# Patient Record
Sex: FEMALE | Race: WHITE
[De-identification: names, ages, dates, MRNs, and addresses within clinical notes are randomized per-mention and may not be internally consistent; named-entity substitution may affect disease eponyms.]

---

## 2017-05-15 ENCOUNTER — HOSPITAL ENCOUNTER (EMERGENCY)
Dept: HOSPITAL 68 - ERH | Age: 72
End: 2017-05-15
Payer: COMMERCIAL

## 2017-05-15 VITALS — BODY MASS INDEX: 23.07 KG/M2 | WEIGHT: 147 LBS | HEIGHT: 67 IN

## 2017-05-15 VITALS — DIASTOLIC BLOOD PRESSURE: 79 MMHG | SYSTOLIC BLOOD PRESSURE: 163 MMHG

## 2017-05-15 DIAGNOSIS — R53.1: Primary | ICD-10-CM

## 2017-05-15 LAB
ABSOLUTE GRANULOCYTE CT: 3.4 /CUMM (ref 1.4–6.5)
BASOPHILS # BLD: 0 /CUMM (ref 0–0.2)
BASOPHILS NFR BLD: 0.4 % (ref 0–2)
EOSINOPHIL # BLD: 0 /CUMM (ref 0–0.7)
EOSINOPHIL NFR BLD: 0.3 % (ref 0–5)
ERYTHROCYTE [DISTWIDTH] IN BLOOD BY AUTOMATED COUNT: 13.3 % (ref 11.5–14.5)
GRANULOCYTES NFR BLD: 71.4 % (ref 42.2–75.2)
HCT VFR BLD CALC: 34.7 % (ref 37–47)
LYMPHOCYTES # BLD: 1 /CUMM (ref 1.2–3.4)
MCH RBC QN AUTO: 31.8 PG (ref 27–31)
MCHC RBC AUTO-ENTMCNC: 34.3 G/DL (ref 33–37)
MCV RBC AUTO: 92.8 FL (ref 81–99)
MONOCYTES # BLD: 0.4 /CUMM (ref 0.1–0.6)
PLATELET # BLD: 256 /CUMM (ref 130–400)
PMV BLD AUTO: 8.2 FL (ref 7.4–10.4)
RED BLOOD CELL CT: 3.74 /CUMM (ref 4.2–5.4)
WBC # BLD AUTO: 4.8 /CUMM (ref 4.8–10.8)

## 2017-05-15 NOTE — ED AMS/SEIZURE/WEAK/DIZZY
History of Present Illness
 
General
Chief Complaint: General Adult
Stated Complaint: ELEAZAR YANG FOR INCREASED WEAKNESS
Source: patient, family, old records
Exam Limitations: no limitations
 
Vital Signs & Intake/Output
Vital Signs & Intake/Output
 Vital Signs
 
 
Date Time Temp Pulse Resp B/P B/P Pulse O2 O2 Flow FiO2
 
     Mean Ox Delivery Rate 
 
05/15 1212 99.1 76 16 146/88  98 Room Air  
 
 
Allergies
Coded Allergies:
No Known Allergies (05/15/17)
 
Triage Note:
72 Y/O FEMALE C/O INTERMITTENT WEAKNESS SINCE END
 OF MARCH; STATES SHE WAS USUALLY ABLE TO "BOUNCE
 BACK"; HOWEVER RECENTLY HAS BEEN NOTICING MORE
 WEAKNESS THAN USUAL.  PT STATES SHE HAD BLOODWORK
 DONE LAST WEEK AND ALL THAT CAME BACK WAS "LOW
 SODIUM".  DENIES PAIN.  REPORTS NORMAL APPETITE/PO
 INTAKE.  DENIES N/V/D.
Triage Nurses Notes Reviewed? yes
Onset: Abrupt
Duration: week(s):, constant, continues in ED
Timing: recent history
Injury Environment: home
No Modifying Factors: none
HPI:
71-year-old female comes into the emergency room with complaints of increased 
fatigue and weakness is been going on for many weeks.  She's been evaluated by 
her primary doctor a few times.  She has had thyroid function Lyme titer as well
as generalized blood work with no findings.  .  She denies any chest pain 
shortness of breath abdominal pain fever chills vomiting or respiratory 
symptoms.  Patient is normally very healthy and this is not normal for her.  
Denies any other associated symptoms.
(MEGAN SALDIVAR)
 
Past History
 
Travel History
Traveled to [place] past 21 day No
 
Medical History
Any Pertinent Medical History? see below for history
Neurological: NONE
EENT: NONE
Cardiovascular: NONE
Respiratory: NONE
Gastrointestinal: NONE
Hepatic: NONE
Renal: NONE
Musculoskeletal: NONE
Psychiatric: NONE
Endocrine: NONE
Blood Disorders: NONE
Cancer(s): NONE
GYN/Reproductive: NONE
 
Surgical History
Surgical History: non-contributory
 
Psychosocial History
What is your primary language English
Tobacco Use: Quit >30 days ago
 
Family History
Hx Contributory? No
(MEGAN SALDIVAR)
 
Review of Systems
 
Review of Systems
Constitutional:
Reports: see HPI. 
EENTM:
Reports: no symptoms. 
Respiratory:
Reports: no symptoms. 
Cardiovascular:
Reports: no symptoms. 
GI:
Reports: no symptoms. 
Genitourinary:
Reports: no symptoms. 
Musculoskeletal:
Reports: no symptoms. 
Skin:
Reports: no symptoms. 
Neurological/Psychological:
Reports: no symptoms. 
Hematologic/Endocrine:
Reports: no symptoms. 
Immunologic/Allergic:
Reports: no symptoms. 
All Other Systems: Reviewed and Negative
(MEGAN SALDIVAR)
 
Physical Exam
 
Physical Exam
General Appearance: well developed/nourished, no apparent distress, alert, awake
Head: atraumatic, normal appearance
Eyes:
Bilateral: normal appearance, PERRL, EOMI. 
Ears, Nose, Throat: normal pharynx, normal ENT inspection, hearing grossly 
normal
Neck: normal inspection, supple, full range of motion
Respiratory: normal breath sounds, no respiratory distress
Cardiovascular: regular rate/rhythm
Gastrointestinal: soft, non-tender
Back: normal inspection
Extremities: normal range of motion
Neurologic/Psych: no motor/sensory deficits, awake, alert, oriented x 3, normal 
gait, normal mood/affect
Skin: intact, normal color
 
Core Measures
ACS in differential dx? Yes
CVA/TIA Diagnosis: No
Severe Sepsis Present: No
Septic Shock Present: No
(MEGAN SALDIVAR)
 
Progress
Differential Diagnosis: arrythmia, alcohol intoxication, anemia, drug 
intoxication, encephalitis, electrolyte imbalance, GI bleed, hypoglycemia, 
hypoxia, intracranial mass/tumor, sepsis, UTI/pyelo, lyme disease
Plan of Care:
 Orders
 
 
Procedure Date/time Status
 
Heart Healthy Diet 05/15 D Active
 
BLOOD CULTURE 05/15 1529 Active
 
URINALYSIS 05/15 1320 Complete
 
TROPONIN LEVEL 05/15 1320 Complete
 
MAGNESIUM 05/15 1320 Complete
 
COMPREHENSIVE METABOLIC PANEL 05/15 1320 Complete
 
CBC WITHOUT DIFFERENTIAL 05/15 1320 Complete
 
EKG 05/15 1320 Active
 
 
 Laboratory Tests
 
 
05/15/17 1457:
Urinalysis LIGHT  H, Urine Color YEL, Urine Clarity CLEAR, Urine pH 7.5, Ur 
Specific Gravity 1.010, Urine Protein TRACE  H, Urine Ketones NEG, Urine Nitrite
NEG, Urine Bilirubin NEG, Urine Urobilinogen 0.2, Ur Leukocyte Esterase NEG, Ur 
Microscopic SEDIMENT EXAMINED, Urine RBC FEW  H, Urine WBC RARE, Ur Epithelial 
Cells RARE, Hyaline Casts RARE  H, Urine Hemoglobin NEG, Urine Glucose NEG
 
05/15/17 1335:
Anion Gap 7, Estimated GFR > 60, BUN/Creatinine Ratio 17.1, Glucose 81, Calcium 
9.3, Magnesium 2.0, Total Bilirubin 0.4, AST 25, ALT 27, Alkaline Phosphatase 67
, Troponin I < 0.01, Total Protein 7.3, Albumin 4.4, Globulin 2.9, Albumin/
Globulin Ratio 1.5, CBC w Diff NO MAN DIFF REQ, RBC 3.74  L, MCV 92.8, MCH 31.8 
H, RDW 13.3, MPV 8.2, Gran % 71.4, Lymphocytes % 19.8  L, Monocytes % 8.1, 
Eosinophils % 0.3, Basophils % 0.4, Absolute Granulocytes 3.4, Absolute 
Lymphocytes 1.0  L, Absolute Monocytes 0.4, Absolute Eosinophils 0, Absolute 
Basophils 0, PUBS MCHC 34.3
 Microbiology
05/15 1540  BLOOD: Blood Culture - RECD
05/15 1529  BLOOD: Blood Culture - ORD
 
Diagnostic Imaging:
Viewed by Me: Radiology Read.  Discussed w/RAD: Radiology Read. 
Radiology Impression:   SERVICE DATE: 05/15/ EXAM TYPE: RAD - XRY-CHEST 
XRAY, PA AND LATERAL EXAMINATION: XR CHEST CLINICAL INFORMATION: Weakness 
COMPARISON: 06/18/2007 TECHNIQUE: 2 views of the chest were obtained. FINDINGS: 
The lungs are well expanded. There is no focal consolidation, edema, or 
effusion. No pneumothorax. The cardiomediastinal silhouette is normal in size 
with a tortuous aorta. No acute osseous abnormality. Mild degenerative changes 
of the spine. IMPRESSION: No acute pulmonary findings. DICTATED BY: JOSUÉ LONG MD  DATE/TIME DICTATED:05/15/17 / 1453 :[email or web address]  DATE/
TIME TRANSCRIBED:05/15/17 / 1453 CONFIDENTIAL, DO NOT COPY WITHOUT APPROPRIATE 
AUTHORIZATION.  <Electronically signed in Other Vendor System>                  
                                                                     SIGNED BY: 
JOSUÉ LONG MD 05/15/17 1457
Initial ED EKG: normal intervals, normal p-waves, normal QRS complex, normal 
sinus rhythm, rate (67)
(DAYSI TORREZ,MEGAN)
 
Departure
 
Departure
Disposition: HOME OR SELF CARE
Condition: Stable
Clinical Impression
Primary Impression: Generalized weakness
Referrals:
CHENTE TORREZ,HAVEN BLAND (PCP/Family)
 
BHARGAV MANNING,DREW HOGAN
 
Additional Instructions:
Follow-up with your primary care doctor.  Return if any concerns worsening 
symptoms.  Follow-up with  [facility]ARE. 
 
Please go over all results of today's visit with your primary care doctor.  
Contact your primary care doctor to let them know you were here in the emergency
room.  There may be nonspecific findings which may not be related to your visit 
today here in the emergency room but may require further evaluation and chronic 
monitoring by your primary care doctor.  If you had a laceration today the 
chance of foreign body always remains. You should follow-up with your primary 
care doctor for recheck in 3-5 days for a wound check.  If you had an x-ray done
there is a chance that a fracture could have been missed on initial read and you
should follow-up with your primary care doctor for repeat x-rays if symptoms 
persist.  If your blood pressure was elevated here in the emergency room please 
have rechecked by her primary care doctor within the next 48 hours by your 
primary care doctor.  If you were prescribed a narcotic here in the emergency 
room or any type of controlled substances you're not allowed to drive while 
taking this medication or operate any type of heavy machinery.  Narcotics can 
make you feel lightheaded dizziness nausea and can cause constipation.  You may 
need to  a stool softener.  Thank you for choosing [facility] 
emergency room.  Please return to the emergency room immediately if you have any
other concerns worsening of symptoms.
 
Departure Forms:
Customer Survey
General Discharge Information
Comments
5/15/2017 3:46:02 PM
 
Patient clinically looks well.  No acute findings to explain the patient's 
symptoms.  She has been walking around here with no difficulty.  She does not 
appear to be toxic appearing.  Patient was seen and evaluated by Dr. Washington.  He 
agrees with plan of care.  Return if any other concerns worsening symptoms.
 
5/15/2017 4:00:35 PM
 
Upon getting ready to discharge the patient she became emotional and started to 
cry.  Patient reports that she's been like this for a long time.  She does not 
have any suicidal or homicidal thoughts.  She reports that typically in the past
she would exercise for her depression but because she hasn't been feeling well 
lately she hasn't been able to exercise.  She does not want to speak to a crisis
here.  She is not a harm herself and feels safe at home.
(MEGAN SALDIVAR)
 
PA/NP Co-Sign Statement
Statement:
ED Attending supervision documentation-
 
[x] I saw and evaluated the patient. I have also reviewed all the pertinent lab 
results and diagnostic results. I agree with the findings and the plan of care 
as documented in the PA's/NP's documentation. 
 
[] I have reviewed the ED Record and agree with the PA's/NP's documentation.
 
[] Additions or exceptions (if any) to the PAs/NP's note and plan are 
summarized below:
[]
 
(ARLETTE WASHINGTON DO)

## 2017-05-15 NOTE — RADIOLOGY REPORT
EXAMINATION:
XR CHEST
 
CLINICAL INFORMATION:
Weakness
 
COMPARISON:
06/18/2007
 
TECHNIQUE:
2 views of the chest were obtained.
 
FINDINGS:
The lungs are well expanded. There is no focal consolidation, edema, or
effusion. No pneumothorax. The cardiomediastinal silhouette is normal in size
with a tortuous aorta. No acute osseous abnormality. Mild degenerative
changes of the spine.
 
IMPRESSION:
No acute pulmonary findings.

## 2018-08-01 ENCOUNTER — HOSPITAL ENCOUNTER (EMERGENCY)
Dept: HOSPITAL 68 - ERH | Age: 73
End: 2018-08-01
Payer: COMMERCIAL

## 2018-08-01 VITALS — WEIGHT: 154 LBS | BODY MASS INDEX: 24.17 KG/M2 | HEIGHT: 67 IN

## 2018-08-01 VITALS — DIASTOLIC BLOOD PRESSURE: 92 MMHG | SYSTOLIC BLOOD PRESSURE: 206 MMHG

## 2018-08-01 DIAGNOSIS — E87.1: Primary | ICD-10-CM

## 2018-08-01 DIAGNOSIS — R53.1: ICD-10-CM

## 2018-08-01 LAB
ABSOLUTE GRANULOCYTE CT: 3.6 /CUMM (ref 1.4–6.5)
BASOPHILS # BLD: 0 /CUMM (ref 0–0.2)
BASOPHILS NFR BLD: 0.5 % (ref 0–2)
EOSINOPHIL # BLD: 0.1 /CUMM (ref 0–0.7)
EOSINOPHIL NFR BLD: 1.1 % (ref 0–5)
ERYTHROCYTE [DISTWIDTH] IN BLOOD BY AUTOMATED COUNT: 13.8 % (ref 11.5–14.5)
GRANULOCYTES NFR BLD: 72.6 % (ref 42.2–75.2)
HCT VFR BLD CALC: 35.7 % (ref 37–47)
LYMPHOCYTES # BLD: 0.9 /CUMM (ref 1.2–3.4)
MCH RBC QN AUTO: 31.9 PG (ref 27–31)
MCHC RBC AUTO-ENTMCNC: 34.9 G/DL (ref 33–37)
MCV RBC AUTO: 91.4 FL (ref 81–99)
MONOCYTES # BLD: 0.4 /CUMM (ref 0.1–0.6)
PLATELET # BLD: 288 /CUMM (ref 130–400)
PMV BLD AUTO: 8 FL (ref 7.4–10.4)
RED BLOOD CELL CT: 3.9 /CUMM (ref 4.2–5.4)
WBC # BLD AUTO: 5 /CUMM (ref 4.8–10.8)

## 2018-08-01 PROCEDURE — 84300 ASSAY OF URINE SODIUM: CPT

## 2018-08-01 PROCEDURE — 84133 ASSAY OF URINE POTASSIUM: CPT

## 2018-08-01 NOTE — ED AMS/SEIZURE/WEAK/DIZZY
History of Present Illness
 
General
Chief Complaint: General Adult
Stated Complaint: ABNORMAL LABS PER PT
Source: patient, old records
Exam Limitations: no limitations
 
Vital Signs & Intake/Output
Vital Signs & Intake/Output
 Vital Signs
 
 
Date Time Temp Pulse Resp B/P B/P Pulse O2 O2 Flow FiO2
 
     Mean Ox Delivery Rate 
 
08/01 1445  78 18 206/92     
 
08/01 1411 98.4 84 20 200/100  99 Room Air  
 
 
 ED Intake and Output
 
 
 08/02 0000 08/01 1200
 
Intake Total 600 0
 
Output Total  
 
Balance 600 0
 
   
 
Intake,  
 
Intake, Oral  0
 
Patient  154 lb
 
Weight  
 
 
 
Allergies
Coded Allergies:
No Known Allergies (05/15/17)
 
Reconcile Medications
Alprazolam 0.5 MG TABLET   1 TAB PO BIDP PRN ANXIETY  (Reported)
Bupropion HCl (Bupropion XL) 150 MG TAB.ER.24H   1 TAB PO QAM MENTAL HEALTH  (
Reported)
 
Triage Note:
PT STATES THAT SHE WAS SENT BY HER PMD DUE TO LOW
 SODIUM( 127) COMPLAINS OF FEELING VERY WEAK. PT
 HAS A HISTORY OF THE SAME BUT USUALLY ABLE TO
 REVERSE IT WITH GATORADE.
Triage Nurses Notes Reviewed? yes
HPI:
Patient presents for evaluation of a low sodium level.  Patient states that she 
was evaluated by her primary care doctor yesterday for weakness and fatigue.  
Blood work was done and she was instructed to go to the emergency department 
because a low sodium level.  She states that she had this happen to her once 
before and she was told to drink Gatorade at that point.
 
Past History
 
Travel History
Traveled to [place] past 21 day No
 
Medical History
Any Pertinent Medical History? see below for history
Neurological: NONE
EENT: NONE
Cardiovascular: NONE
Respiratory: NONE
Gastrointestinal: NONE
Hepatic: NONE
Renal: NONE
Musculoskeletal: NONE
Psychiatric: anxiety, depression
Endocrine: NONE
Blood Disorders: NONE
Cancer(s): NONE
GYN/Reproductive: NONE
 
Surgical History
Surgical History: non-contributory
 
Psychosocial History
What is your primary language English
Tobacco Use: Never used
ETOH Use: denies use
Illicit Drug Use: denies illicit drug use
 
Family History
Hx Contributory? No
 
Review of Systems
 
Review of Systems
Constitutional:
Reports: see HPI. 
EENTM:
Reports: no symptoms. 
Respiratory:
Reports: no symptoms. 
Cardiovascular:
Reports: no symptoms. 
GI:
Reports: no symptoms. 
Genitourinary:
Reports: no symptoms. 
Musculoskeletal:
Reports: see HPI. 
Skin:
Reports: no symptoms. 
Neurological/Psychological:
Reports: no symptoms. 
Hematologic/Endocrine:
Reports: no symptoms. 
Immunologic/Allergic:
Reports: no symptoms. 
All Other Systems: Reviewed and Negative
 
Physical Exam
 
Physical Exam
General Appearance: see below
Comments:
Gen.: Well-nourished, well-developed, no acute respiratory distress.
Head: Normocephalic, atraumatic.
Eyes: Normal inspection bilaterally
Ears: Normal inspection bilaterally
Nose: Normal inspection
Throat/mouth : Moist mucosa 
Neck: Supple, full range of motion, no goiter
Heart: Regular rate and rhythm, no murmurs rubs or gallops
Lungs: Clear to auscultation bilaterally with normal air entry
Chest: Nontender
Back: Normal range of motion
Abdomen: Soft, nontender, nondistended, normal bowel sounds
Extremities: Normal range of motion grossly, equal radial pulses, no cyanosis 
clubbing or edema, normal strength, normal deep tendon reflexes
Neurologic: Cranial nerves grossly intact, speech is clear
Skin: warm and dry
Psychiatric: Calm, cooperative, no apparent delusions or hallucinations
 
 
Core Measures
ACS in differential dx? No
CVA/TIA Diagnosis No
Sepsis Present: No
Sepsis Focused Exam Completed? No
 
Progress
Differential Diagnosis: hyponatremia, renal disease, electrolyte abnormality, 
dehydration, anemia
Plan of Care:
 Orders
 
 
Procedure Date/time Status
 
Add-on Test (ER Only) 08/01 1141 Active
 
URINE OSMOLALITY 08/01 1123 Complete
 
URINE LYTES, SPOT 08/01 1123 Complete
 
URINALYSIS 08/01 1123 Complete
 
TSH REFLEX 08/01 1123 Complete
 
TROPONIN LEVEL 08/01 1123 Complete
 
SERUM OSMOLALITY 08/01 1123 Complete
 
MAGNESIUM 08/01 1123 Complete
 
COMPREHENSIVE METABOLIC PANEL 08/01 1123 Complete
 
CBC WITHOUT DIFFERENTIAL 08/01 1123 Complete
 
EKG 08/01 1123 Active
 
 
 Laboratory Tests
 
 
 
08/01/18 1236:
Urine Color YEL, Urine Clarity CLEAR, Urine pH 7.5, Ur Specific Gravity 1.010, 
Urine Protein NEG, Urine Ketones NEG, Urine Nitrite NEG, Urine Bilirubin NEG, 
Urine Urobilinogen 0.2, Ur Leukocyte Esterase NEG, Ur Microscopic EXAM NOT 
REQUIRED, Urine Hemoglobin NEG, Urine Glucose NEG
 
08/01/18 1236:
Urine Osmolality 315, Ur Random Creatinine 37.7, Ur Random Sodium 41, Ur Random 
Potassium 63.9, Fraction Sodium Excret 0.6
 
08/01/18 1224:
Anion Gap 11, Estimated GFR > 60, BUN/Creatinine Ratio 21.4, Glucose 78, Serum 
Osmolality 271  L, Calcium 9.0, Magnesium 1.9, Total Bilirubin 0.4, AST 29, ALT 
29, Alkaline Phosphatase 60, Troponin I < 0.01, Total Protein 7.5, Albumin 4.4, 
Globulin 3.1, Albumin/Globulin Ratio 1.4, TSH &T3 &Free T4 Intrp 1.210
 
08/01/18 1150:
CBC w Diff NO MAN DIFF REQ, RBC 3.90  L, MCV 91.4, MCH 31.9  H, MCHC 34.9, RDW 
13.8, MPV 8.0, Gran % 72.6, Lymphocytes % 17.8  L, Monocytes % 8.0, Eosinophils 
% 1.1, Basophils % 0.5, Absolute Granulocytes 3.6, Absolute Lymphocytes 0.9  L, 
Absolute Monocytes 0.4, Absolute Eosinophils 0.1, Absolute Basophils 0
 
Initial ED EKG: NSR, no ST T wave changes
Prior EKG: unchanged
Comments:
8/1/2018 2:14:11 PM I have updated Rachel on her test results.  Unfortunately 
her sodium has increased although it is not clear if this is a true increase or 
simply lab variant.  She has politely declined hospitalization for her 
hyponatremia despite her complaint of weakness and fatigue.  I'm attempting to 
contact her primary care doctor for an outpatient treatment regimen.
 
Departure
 
Departure
Disposition: HOME OR SELF CARE
Condition: Stable
Clinical Impression
Primary Impression: Hyponatremia
Referrals:
Goyo TORREZ,Estrellita Bruner (PCP/Family)
 
Additional Instructions:
Please note that I felt it was prudent for you to be admitted to treat your low 
sodium level.  Restrict your free water intake to 16 oz daily.  Increase the 
salt in your diet.  Follow-up with your primary care physician tomorrow for 
reevaluation.  Return if any concerns or sudden worsening.
Departure Forms:
Customer Survey
General Discharge Information

## 2018-08-29 ENCOUNTER — HOSPITAL ENCOUNTER (EMERGENCY)
Dept: HOSPITAL 68 - ERH | Age: 73
End: 2018-08-29
Payer: COMMERCIAL

## 2018-08-29 VITALS — BODY MASS INDEX: 23.86 KG/M2 | HEIGHT: 67 IN | WEIGHT: 152 LBS

## 2018-08-29 VITALS — DIASTOLIC BLOOD PRESSURE: 76 MMHG | SYSTOLIC BLOOD PRESSURE: 156 MMHG

## 2018-08-29 DIAGNOSIS — F32.9: ICD-10-CM

## 2018-08-29 DIAGNOSIS — R03.0: ICD-10-CM

## 2018-08-29 DIAGNOSIS — R53.1: Primary | ICD-10-CM

## 2018-08-29 DIAGNOSIS — F41.9: ICD-10-CM

## 2018-08-29 DIAGNOSIS — R51: ICD-10-CM

## 2018-08-29 LAB
ABSOLUTE GRANULOCYTE CT: 4.2 /CUMM (ref 1.4–6.5)
BASOPHILS # BLD: 0 /CUMM (ref 0–0.2)
BASOPHILS NFR BLD: 0.4 % (ref 0–2)
EOSINOPHIL # BLD: 0 /CUMM (ref 0–0.7)
EOSINOPHIL NFR BLD: 0.4 % (ref 0–5)
ERYTHROCYTE [DISTWIDTH] IN BLOOD BY AUTOMATED COUNT: 13.1 % (ref 11.5–14.5)
GRANULOCYTES NFR BLD: 77.4 % (ref 42.2–75.2)
HCT VFR BLD CALC: 33.2 % (ref 37–47)
LYMPHOCYTES # BLD: 0.8 /CUMM (ref 1.2–3.4)
MCH RBC QN AUTO: 31.6 PG (ref 27–31)
MCHC RBC AUTO-ENTMCNC: 34.8 G/DL (ref 33–37)
MCV RBC AUTO: 91 FL (ref 81–99)
MONOCYTES # BLD: 0.4 /CUMM (ref 0.1–0.6)
PLATELET # BLD: 295 /CUMM (ref 130–400)
PMV BLD AUTO: 7.8 FL (ref 7.4–10.4)
RED BLOOD CELL CT: 3.66 /CUMM (ref 4.2–5.4)
WBC # BLD AUTO: 5.5 /CUMM (ref 4.8–10.8)

## 2018-08-29 PROCEDURE — 86618 LYME DISEASE ANTIBODY: CPT

## 2018-08-29 NOTE — RADIOLOGY REPORT
EXAMINATION:
XR CHEST
 
CLINICAL INFORMATION:
Weakness.
 
COMPARISON:
12/01/2017
 
TECHNIQUE:
2 views of the chest were obtained.
 
FINDINGS:
The lungs are well-inflated and clear. Trachea is midline in position.  No
interstitial disease, consolidation, mass, pneumothorax or pleural effusion.
Mild biapical pleural thickening is unchanged.
 
The cardiac silhouette is normal in size. The mediastinal, hilar and
diaphragmatic contours are normal.
 
Mild spondylosis of the thoracic spine. The upper abdomen is unremarkable.
 
IMPRESSION:
No acute pulmonary disease compared to 12/01/2017.

## 2018-08-29 NOTE — ED AMS/SEIZURE/WEAK/DIZZY
History of Present Illness
 
General
Chief Complaint: General Adult
Stated Complaint: WEAKNESS BILATERAL LEGS/NAUSEA
Source: patient, old records
Exam Limitations: no limitations
Allergies
Coded Allergies:
No Known Allergies (18)
 
Reconcile Medications
Alprazolam 0.5 MG TABLET   1 TAB PO BIDP PRN ANXIETY  (Reported)
Amlodipine Besylate 5 MG TABLET   1 TAB PO DAILY HTN
Bupropion HCl (Bupropion XL) 150 MG TAB.ER.24H   1 TAB PO QAM MENTAL HEALTH  (
Reported)
 
Triage Note:
PT TO ED FOR C/C OF WEAKNESS, NAUSEA AND HEADACHE.
WEAKNESS X 3 WEEKS, WENT TO PRIMARY A 4 WEEKS AGO
AND DIAGNOSED WITH HYPONATREMIA AND TOLD TO TREAT
IT WITH GATORADE.
Triage Nurses Notes Reviewed? yes
Onset: Gradual
Duration: week(s): (4-6), changing over time, continues in ED, getting worse
Timing: recent history
Injury Environment: home
Severity: mild, moderate
No Modifying Factors: none
Associated Symptoms: HEADACHE, WEAKNESS, FATIGUE
LMP (ages 10-50): post menopausal
Pregnant: No
Patient currently breastfeeds: No
HPI:
73-year-old female past medical history of anxiety, depression and hyponatremia 
presents for evaluation of bilateral lower 70 weakness and fatigue.  Patient 
reports this is been going on for the past 4-6 weeks.  She was evaluated in the 
emergency Department with a primary care doctor 4 weeks ago and was diagnosed 
with hyponatremia.  She was given some IV fluids and instructed to restrict 
fluids and increase salt intake.  She reports since then she has not improved 
and her weakness has persisted.  She feels like her thigh muscles are weak 
bilaterally.  She is able to walk without difficulty she has no pain in her legs
or other muscles.  She does note she developed a headache over the past few days
that is been intermittent and responded well to Advil which he took prior to 
arrival.  She also reports there have been some changes in her psychiatric 
medications over the past several weeks Wellbutrin was discontinued and she was 
started on Celexa 3 weeks ago.  She is also taking Xanax as needed for anxiety. 
Denies any drug or alcohol use.  No chest pain shortness of breath palpitations 
one-sided weakness changes in vision slurred speech fever neck pain or any other
associated symptoms.  No nausea vomiting diarrhea or abdominal pain.  No rashes 
or joint swelling.
(Aaron Lewis)
 
Vital Signs & Intake/Output
Vital Signs & Intake/Output
 Vital Signs
 
 
Date Time Temp Pulse Resp B/P B/P Pulse O2 O2 Flow FiO2
 
     Mean Ox Delivery Rate 
 
 1410 98.0 78 20 156/76  97 Room Air  
 
 1240    182/80     
 
 1217 98.3 69 16 216/96     
 
 1212 98.3 69 16 216/96  97 Room Air  
 
 0855 96.2 76 15 165/96  97 Room Air Room Air 
 
 
 
(Guru MANNING,Tonya)
 
Past History
 
Travel History
Traveled to [place] past 21 day No
 
Medical History
Any Pertinent Medical History? see below for history
Neurological: NONE
EENT: NONE
Cardiovascular: NONE
Respiratory: NONE
Gastrointestinal: NONE
Hepatic: NONE
Renal: NONE
Musculoskeletal: NONE
Psychiatric: anxiety, depression
Endocrine: NONE
Blood Disorders: HYPONATREMIA
Cancer(s): NONE
GYN/Reproductive: NONE
 
Surgical History
Surgical History: non-contributory
 
Psychosocial History
What is your primary language English
Tobacco Use: Quit >30 days ago
ETOH Use: denies use
Illicit Drug Use: denies illicit drug use
 
Family History
Hx Contributory? No
(Aaron Lewis)
 
Review of Systems
 
Review of Systems
Constitutional:
Reports: see HPI, malaise, weakness. 
EENTM:
Reports: no symptoms. 
Respiratory:
Reports: no symptoms. 
Cardiovascular:
Reports: no symptoms. 
GI:
Reports: no symptoms. 
Genitourinary:
Reports: no symptoms. 
Musculoskeletal:
Reports: no symptoms. 
Skin:
Reports: no symptoms. 
Neurological/Psychological:
Reports: headache. 
Hematologic/Endocrine:
Reports: no symptoms. 
Immunologic/Allergic:
Reports: no symptoms. 
All Other Systems: Reviewed and Negative
(Aaron Lewis)
 
Physical Exam
 
Physical Exam
General Appearance: well developed/nourished, no apparent distress, alert, awake
Head: atraumatic, normal appearance
Eyes:
Bilateral: normal appearance, PERRL, EOMI. 
Ears, Nose, Throat: normal pharynx, normal ENT inspection, hearing grossly 
normal
Neck: normal inspection, supple, full range of motion
Respiratory: normal breath sounds, chest non-tender, no respiratory distress, 
lungs clear
Cardiovascular: regular rate/rhythm, normal peripheral pulses
Peripheral Pulses:
2+ radial (R), 2+ radial (L)
Gastrointestinal: soft, non-tender
Back: normal inspection, normal range of motion, no vertebral tenderness
Extremities: normal range of motion, STRENGTH IS 5 OUT OF 5 IN THE BILATERAL 
LOWER EXTREMITIES.  pATELLAR dtrS 2+ BILATERALLY NO SWELLING ERYTHEMA OR 
BRUISING IN THE BILATERAL LOWER EXTREMITIES.  pATIENT IS ABLE TO WALK AND BEAR 
WEIGHT
Neurologic/Psych: no motor/sensory deficits, awake, alert, oriented x 3, normal 
gait, normal mood/affect
Skin: intact, normal color, warm/dry
Lymphatic: no anterior cervical jenise
 
Core Measures
ACS in differential dx? No
CVA/TIA Diagnosis No
Sepsis Present: No
Sepsis Focused Exam Completed? No
(Tyler TORREZ,Aaron)
 
Progress
Differential Diagnosis: arrythmia, anemia, CVA/stroke, dehydration, electrolyte 
imbalance, intracranial Hem., intracranial mass/tumor, migraine HA, post-
traumatic vertigo, sepsis, seizure disorder, subarachnoid Hem., UTI/pyelo, 
vertebrobasilar insuff
Diagnostic Imaging:
Viewed by Me: Radiology Read, CT Scan.  Discussed w/RAD: Radiology Read, CT 
Scan. 
Radiology Impression: PATIENT: NIKKI ARNOLD  MEDICAL RECORD NO: [id number] PRESENT
AGE: 73  PATIENT ACCOUNT NO: [id number] : 43  LOCATION: [facility] ORDERING 
PHYSICIAN: Aaron TORREZ     SERVICE DATE:  EXAM TYPE: CAT - CT 
HEAD WO IV CONTRAST EXAMINATION: CT HEAD WITHOUT CONTRAST CLINICAL INFORMATION: 
Headache. Hypertensive urgency. COMPARISON: None TECHNIQUE: Contiguous axial 
imaging was performed from the skull base to vertex without intravenous 
administration of contrast. DLP: 616 mGy-cm FINDINGS: Brain parenchyma: No acute
findings. Grey-white matter differentiation is well preserved. No evidence of an
acute major vascular territory infarction, hemorrhage, mass or midline shift. 
There is mild atrophy of each cerebral hemisphere. Cerebrospinal fluid spaces: 
Mild atrophy of cerebral hemispheres associated with symmetric prominence of 
ventricles and sulci. No hydrocephalus or extra-axial fluid collections. 
Cerebellum and brainstem: Unremarkable. The 4th ventricle is midline in 
position.  The cerebellopontine angles are normal. Calvarium and 
temporomandibular joints: Calvarium is intact. Mastoid air cells and middle ear 
cavities are well aerated. The TMJs are normal. Paranasal sinuses and orbits: 
The visualized paranasal sinuses are well aerated. The visualized orbits and 
globes are unremarkable. Other: No acute findings in the visualized extracranial
soft tissues. IMPRESSION: No acute intracranial pathology. DICTATED BY: Luís Alvarenga MD  DATE/TIME DICTATED:18 :NESTOR  DATE/
TIME TRANSCRIBED:18 CONFIDENTIAL, DO NOT COPY WITHOUT APPROPRIATE 
AUTHORIZATION.  <Electronically signed in Other Vendor System>                  
                                                                     SIGNED BY: 
Luís Alvarenga MD 18 1355
CXR Impression: PATIENT: NIKKI ARNOLD  MEDICAL RECORD NO: [id number] PRESENT AGE: 
73  PATIENT ACCOUNT NO: [id number] : 43  LOCATION: [facility] ORDERING PHYSICIAN:
Ella TORREZ     SERVICE DATE:  EXAM TYPE: RAD - XRY-CHEST 
XRAY, TWO VIEWS EXAMINATION: XR CHEST CLINICAL INFORMATION: Weakness. COMPARISON
: 2017 TECHNIQUE: 2 views of the chest were obtained. FINDINGS: The lungs 
are well-inflated and clear. Trachea is midline in position.  No interstitial 
disease, consolidation, mass, pneumothorax or pleural effusion. Mild biapical 
pleural thickening is unchanged. The cardiac silhouette is normal in size. The 
mediastinal, hilar and diaphragmatic contours are normal. Mild spondylosis of 
the thoracic spine. The upper abdomen is unremarkable. IMPRESSION: No acute 
pulmonary disease compared to 2017. DICTATED BY: Luís Alvarenga MD  DATE/
TIME DICTATED:18 :RAD.NUÑEZ  DATE/TIME TRANSCRIBED:
18 CONFIDENTIAL, DO NOT COPY WITHOUT APPROPRIATE AUTHORIZATION.  <
Electronically signed in Other Vendor System>                                   
                                                    SIGNED BY: Luís Alvarenga MD
18 0957
Initial ED EKG: normal sinus rhythm, no ST T wave changes
Prior EKG: unchanged
(Tyler TORREZ,Aaron)
Plan of Care:
 Orders
 
 
Procedure Date/time Status
 
Add-on Test (ER Only)  1127 Active
 
Add-on Test (ER Only)  1126 Active
 
TSH REFLEX 922 Complete
 
CREATINE PHOSPHOKINASE 0922 Complete
 
VITAMIN B12  0922 Complete
 
URINALYSIS  09 Complete
 
TROPONIN LEVEL  09 Complete
 
MAGNESIUM  0913 Complete
 
LYME TITRE 913 Active
 
LIPASE  09 Complete
 
LACTIC ACID 913 Complete
 
COMPREHENSIVE METABOLIC PANEL 913 Complete
 
CBC WITHOUT DIFFERENTIAL 913 Complete
 
EKG  0859 Active
 
 
 Current Medications
 
 
  Sig/Abi Start time  Last
 
Medication Dose  Stop Time Status Admin
 
Acetaminophen 1,000 MG ONCE ONE  1345 CAN 
 
(Ofirmev)    1359  
 
N/A 1 UNIT    
 
(No Carrier)     
 
 
 Laboratory Tests
 
 
 
18 1220:
Urine Color STRAW, Urine Clarity CLEAR, Urine pH 7.5, Ur Specific Gravity 1.010,
Urine Protein NEG, Urine Ketones NEG, Urine Nitrite NEG, Urine Bilirubin NEG, 
Urine Urobilinogen 0.2, Ur Leukocyte Esterase TRACE  H, Ur Microscopic SEDIMENT 
EXAMINED, Urine RBC RARE, Urine WBC RARE, Ur Epithelial Cells FEW, Urine 
Hemoglobin NEG, Urine Glucose NEG
 
18 1213:
Lactic Acid Cancelled
 
18 0922:
Anion Gap 7, Estimated GFR > 60, BUN/Creatinine Ratio 14.3, Glucose 91, Lactic 
Acid 0.7, Calcium 9.3, Magnesium 2.1, Total Bilirubin 0.4, AST 26, ALT 29, 
Alkaline Phosphatase 64, Creatine Kinase 67, Troponin I < 0.01, Total Protein 
7.1, Albumin 4.2, Globulin 2.9, Albumin/Globulin Ratio 1.4, Lipase 93, Vitamin 
B12 > 1000  H, TSH &T3 &Free T4 Intrp 0.761, CBC w Diff NO MAN DIFF REQ, RBC 
3.66  L, MCV 91.0, MCH 31.6  H, MCHC 34.8, RDW 13.1, MPV 7.8, Gran % 77.4  H, 
Lymphocytes % 15.3  L, Monocytes % 6.5, Eosinophils % 0.4, Basophils % 0.4, 
Absolute Granulocytes 4.2, Absolute Lymphocytes 0.8  L, Absolute Monocytes 0.4, 
Absolute Eosinophils 0, Absolute Basophils 0
 
18 0913:
Lyme Disease Antibody Pending
 
Patient is here for evaluation of headache fatigue bilateral lower extremity 
weakness.  Her exam is within normal limits she's neurologically intact.  She 
has a steady gait.  She has a history of hyponatremia and states she has been 
feeling this way for the past month.  Basic blood work was obtained and shows a 
sodium of 131.  Patient will be given a liter of normal saline.  Vitamin B12, 
TSH CK and Lyme titer was added on.  Patient will also get a head CT.  She was 
medicated with Tylenol for headache.  EKG is sinus rhythm without significant ST
or T-wave changes.
 
On reevaluation patient's blood pressure is elevated to 200s over 90s.  She has 
no previous diagnosis of hypertension.  She is not taking any antihypertensives.
 On review of her previous vital signs she was hypertensive to similar numbers 
last month at the previous visit.  She was medicated with 10 mg of IV 
hydralazine.
 
BLOOD PRESSURE Is improved after IV meds.  She's now 180s over 80s.  She has no 
chest pain or shortness of breath or headache is better after Tylenol.  Head CT 
is negative.  Remaining labs are within normal limits Lyme titer still pending. 
Patient will be discharged home with a prescription for Norvasc due to her 
persistently elevated blood pressure on multiple visits.  She was instructed to 
follow up with her primary care doctor as soon as possible to review all result 
of today's visit.  She is seeing a neurologist next month for evaluation of this
as well.  Discussed return precautions in detail patient agrees the plan case 
discussed with Dr. NORMAN and he agrees.
 
(Aaron Lewis)
(Guru MANNING,[facility])
 
Departure
 
Departure
Disposition: HOME OR SELF CARE
Condition: Stable
Clinical Impression
Primary Impression: Generalized weakness
Secondary Impressions: Elevated blood pressure reading without diagnosis of 
hypertension
Referrals:
Estrellita Giron (PCP/Family)
 
Additional Instructions:
Start taking amlodipine once daily tomorrow for your blood pressure.  Continue 
Tylenol as needed for headaches.  Make a follow-up with YOUR primary care doctor
as soon as possible to review all results of today's visit.  Monitor symptoms 
closely return with chest pain shortness of breath fever severe headaches 
slurred speech one-sided weakness or any other concerns.
Departure Forms:
Customer Survey
General Discharge Information
Prescriptions:
Current Visit Scripts
Amlodipine Besylate 1 TAB PO DAILY  
     #30 TAB 
 
 
(Aaron Lewis)
 
PA/NP Co-Sign Statement
Statement:
ED Attending supervision documentation-
 
[x] I saw and evaluated the patient. I have also reviewed all the pertinent lab 
results and diagnostic results. I agree with the findings and the plan of care 
as documented in the PA's/NP's documentation. 
 
[] I have reviewed the ED Record and agree with the PA's/NP's documentation.
 
[] Additions or exceptions (if any) to the PAs/NP's note and plan are 
summarized below:
[]
 
73-year-old female complains of generalized feeling of weakness and muscle 
aches.   she reports this to be chronic going on for many months.  She does not 
give any specific reason for coming to the emergency room today.  The patient 
also reports some mild headache but reports that that is not a reason for the 
visit and that is not uncommon for her to have minimal headache.
 
The patient's main concern is that she her sodium is low again.
 
The patient's workup shows a sodium of 131.  Rest of the workup is benign.  The 
patient is in no distress.  She has perfect neurological exam.  Her head CT is 
negative.
 
Physical exam is benign including normal neurological exam.  No meningismus.
 
Plan: Chronic weakness, outpatient follow-up with PCP.
 
Hypertension: Incidental finding, also present last time.  Patient initially 
given IV antihypertensive.  This is usually not indicated in such case because 
the patient has no signs or symptoms of hypertensive urgency or emergency.  But 
her blood pressure responded nicely.  After that the patient walked around the 
with me and felt quite well.
 
We will start her on antihypertensives and have her monitor blood pressure daily
follow-up with primary care doctor.
 
Headache: Mild, no meningismus, normal neurological exam, negative CT.  No 
concern for life-threatening pathology including subarachnoid hemorrhage, stroke
, bleed, tumor.  This is not the patient's complaint at all, this is chronic and
unchanged mild for the patient.
 
Plan: Outpatient follow-up, low-dose antihypertensive, monitor sodium with PCP.
(Guru MANNING,[facility])

## 2018-08-29 NOTE — CT SCAN REPORT
EXAMINATION:
CT HEAD WITHOUT CONTRAST
 
CLINICAL INFORMATION:
Headache. Hypertensive urgency.
 
COMPARISON:
None
 
TECHNIQUE:
Contiguous axial imaging was performed from the skull base to vertex without
intravenous administration of contrast.
 
DLP:
616 mGy-cm
 
FINDINGS:
 
Brain parenchyma: No acute findings. Grey-white matter differentiation is
well preserved. No evidence of an acute major vascular territory infarction,
hemorrhage, mass or midline shift. There is mild atrophy of each cerebral
hemisphere.
 
Cerebrospinal fluid spaces: Mild atrophy of cerebral hemispheres associated
with symmetric prominence of ventricles and sulci. No hydrocephalus or
extra-axial fluid collections.
 
Cerebellum and brainstem: Unremarkable. The 4th ventricle is midline in
position.  The cerebellopontine angles are normal.
 
Calvarium and temporomandibular joints: Calvarium is intact. Mastoid air
cells and middle ear cavities are well aerated. The TMJs are normal.
 
Paranasal sinuses and orbits: The visualized paranasal sinuses are well
aerated. The visualized orbits and globes are unremarkable.
 
Other: No acute findings in the visualized extracranial soft tissues.
 
IMPRESSION:
No acute intracranial pathology.